# Patient Record
Sex: MALE | URBAN - METROPOLITAN AREA
[De-identification: names, ages, dates, MRNs, and addresses within clinical notes are randomized per-mention and may not be internally consistent; named-entity substitution may affect disease eponyms.]

---

## 2022-07-21 ENCOUNTER — APPOINTMENT (RX ONLY)
Dept: URBAN - METROPOLITAN AREA CLINIC 134 | Facility: CLINIC | Age: 67
Setting detail: DERMATOLOGY
End: 2022-07-21

## 2022-07-21 DIAGNOSIS — T07XXXA INSECT BITE, NONVENOMOUS, OF OTHER, MULTIPLE, AND UNSPECIFIED SITES, WITHOUT MENTION OF INFECTION: ICD-10-CM

## 2022-07-21 DIAGNOSIS — D49.2 NEOPLASM OF UNSPECIFIED BEHAVIOR OF BONE, SOFT TISSUE, AND SKIN: ICD-10-CM

## 2022-07-21 DIAGNOSIS — D18.0 HEMANGIOMA: ICD-10-CM

## 2022-07-21 DIAGNOSIS — L81.4 OTHER MELANIN HYPERPIGMENTATION: ICD-10-CM

## 2022-07-21 DIAGNOSIS — D22 MELANOCYTIC NEVI: ICD-10-CM

## 2022-07-21 DIAGNOSIS — L57.8 OTHER SKIN CHANGES DUE TO CHRONIC EXPOSURE TO NONIONIZING RADIATION: ICD-10-CM | Status: STABLE

## 2022-07-21 DIAGNOSIS — L82.1 OTHER SEBORRHEIC KERATOSIS: ICD-10-CM

## 2022-07-21 PROBLEM — S80.862A INSECT BITE (NONVENOMOUS), LEFT LOWER LEG, INITIAL ENCOUNTER: Status: ACTIVE | Noted: 2022-07-21

## 2022-07-21 PROBLEM — D18.01 HEMANGIOMA OF SKIN AND SUBCUTANEOUS TISSUE: Status: ACTIVE | Noted: 2022-07-21

## 2022-07-21 PROBLEM — S80.861A INSECT BITE (NONVENOMOUS), RIGHT LOWER LEG, INITIAL ENCOUNTER: Status: ACTIVE | Noted: 2022-07-21

## 2022-07-21 PROBLEM — D22.5 MELANOCYTIC NEVI OF TRUNK: Status: ACTIVE | Noted: 2022-07-21

## 2022-07-21 PROCEDURE — ? BIOPSY BY SHAVE METHOD

## 2022-07-21 PROCEDURE — ? PRESCRIPTION MEDICATION MANAGEMENT

## 2022-07-21 PROCEDURE — ? SUNSCREEN RECOMMENDATIONS

## 2022-07-21 PROCEDURE — ? COUNSELING

## 2022-07-21 PROCEDURE — 11102 TANGNTL BX SKIN SINGLE LES: CPT

## 2022-07-21 PROCEDURE — 99203 OFFICE O/P NEW LOW 30 MIN: CPT | Mod: 25

## 2022-07-21 ASSESSMENT — LOCATION DETAILED DESCRIPTION DERM
LOCATION DETAILED: LEFT LATERAL SUPERIOR CHEST
LOCATION DETAILED: LEFT INFERIOR MEDIAL FOREHEAD
LOCATION DETAILED: RIGHT MID-UPPER BACK
LOCATION DETAILED: LEFT DISTAL PRETIBIAL REGION
LOCATION DETAILED: LEFT MID-UPPER BACK
LOCATION DETAILED: RIGHT DISTAL PRETIBIAL REGION
LOCATION DETAILED: RIGHT INFERIOR UPPER BACK
LOCATION DETAILED: LEFT PROXIMAL DORSAL FOREARM
LOCATION DETAILED: RIGHT INFERIOR CENTRAL MALAR CHEEK

## 2022-07-21 ASSESSMENT — LOCATION SIMPLE DESCRIPTION DERM
LOCATION SIMPLE: LEFT FOREARM
LOCATION SIMPLE: CHEST
LOCATION SIMPLE: RIGHT CHEEK
LOCATION SIMPLE: LEFT PRETIBIAL REGION
LOCATION SIMPLE: RIGHT UPPER BACK
LOCATION SIMPLE: LEFT UPPER BACK
LOCATION SIMPLE: RIGHT PRETIBIAL REGION
LOCATION SIMPLE: LEFT FOREHEAD

## 2022-07-21 ASSESSMENT — LOCATION ZONE DERM
LOCATION ZONE: LEG
LOCATION ZONE: TRUNK
LOCATION ZONE: ARM
LOCATION ZONE: FACE

## 2022-07-21 NOTE — PROCEDURE: BIOPSY BY SHAVE METHOD
Detail Level: Detailed
Depth Of Biopsy: dermis
Was A Bandage Applied: Yes
Size Of Lesion In Cm: 0.8
X Size Of Lesion In Cm: 0
Biopsy Type: H and E
Biopsy Method: Dermablade
Anesthesia Type: 1% lidocaine with epinephrine
Anesthesia Volume In Cc (Will Not Render If 0): 0.3
Hemostasis: [place]'s
Wound Care: Vaseline
Dressing: Band-Aid
Destruction After The Procedure: No
Type Of Destruction Used: Curettage
Cryotherapy Text: The wound bed was treated with cryotherapy after the biopsy was performed.
Electrodesiccation Text: The wound bed was treated with electrodesiccation after the biopsy was performed.
Electrodesiccation And Curettage Text: The wound bed was treated with electrodesiccation and curettage after the biopsy was performed.
Silver Nitrate Text: The wound bed was treated with silver nitrate after the biopsy was performed.
Lab: [facility]
Lab Facility: [id number]
Path Notes (To The Dermatopathologist): Possible reoccurrence
Consent: The provider's intent is to obtain a tissue sample solely for diagnostic purposes. Written consent to obtain tissue sample was obtained and risks were reviewed including but not limited to scarring, infection, bleeding, scabbing, incomplete removal, nerve damage and allergy to anesthesia.
Post-Care Instructions: I reviewed with the patient in detail post-care instructions. Patient is to keep the biopsy site dry overnight, and then apply vaseline daily until healed.
Notification Instructions: Patient will be notified of biopsy results. However, patient instructed to call the office if not contacted within 2 weeks.
Billing Type: [employer]
Information: Selecting Yes will display possible errors in your note based on the variables you have selected. This validation is only offered as a suggestion for you. PLEASE NOTE THAT THE VALIDATION TEXT WILL BE REMOVED WHEN YOU FINALIZE YOUR NOTE. IF YOU WANT TO FAX A PRELIMINARY NOTE YOU WILL NEED TO TOGGLE THIS TO 'NO' IF YOU DO NOT WANT IT IN YOUR FAXED NOTE.

## 2022-07-21 NOTE — PROCEDURE: PRESCRIPTION MEDICATION MANAGEMENT
Plan: Patient declines RX today to treat bites
Render In Strict Bullet Format?: No
Detail Level: Detailed